# Patient Record
Sex: MALE | Race: WHITE | NOT HISPANIC OR LATINO | URBAN - METROPOLITAN AREA
[De-identification: names, ages, dates, MRNs, and addresses within clinical notes are randomized per-mention and may not be internally consistent; named-entity substitution may affect disease eponyms.]

---

## 2020-07-07 ENCOUNTER — EMERGENCY (EMERGENCY)
Facility: HOSPITAL | Age: 75
LOS: 1 days | Discharge: HOME | End: 2020-07-07
Attending: EMERGENCY MEDICINE | Admitting: EMERGENCY MEDICINE
Payer: MEDICARE

## 2020-07-07 VITALS
RESPIRATION RATE: 16 BRPM | OXYGEN SATURATION: 94 % | WEIGHT: 279.99 LBS | SYSTOLIC BLOOD PRESSURE: 169 MMHG | DIASTOLIC BLOOD PRESSURE: 76 MMHG | HEART RATE: 100 BPM | TEMPERATURE: 97 F

## 2020-07-07 PROCEDURE — 99283 EMERGENCY DEPT VISIT LOW MDM: CPT

## 2020-07-07 PROCEDURE — 99284 EMERGENCY DEPT VISIT MOD MDM: CPT | Mod: 25

## 2020-07-07 PROCEDURE — 96374 THER/PROPH/DIAG INJ IV PUSH: CPT

## 2020-07-07 PROCEDURE — 96375 TX/PRO/DX INJ NEW DRUG ADDON: CPT

## 2020-07-07 RX ORDER — TAMSULOSIN HYDROCHLORIDE 0.4 MG/1
0.4 CAPSULE ORAL ONCE
Refills: 0 | Status: DISCONTINUED | OUTPATIENT
Start: 2020-07-07 | End: 2020-07-07

## 2020-07-07 RX ORDER — FAMOTIDINE 10 MG/ML
20 INJECTION INTRAVENOUS ONCE
Refills: 0 | Status: COMPLETED | OUTPATIENT
Start: 2020-07-07 | End: 2020-07-07

## 2020-07-07 RX ORDER — DIPHENHYDRAMINE HCL 50 MG
50 CAPSULE ORAL ONCE
Refills: 0 | Status: COMPLETED | OUTPATIENT
Start: 2020-07-07 | End: 2020-07-07

## 2020-07-07 RX ORDER — DEXAMETHASONE 0.5 MG/5ML
10 ELIXIR ORAL ONCE
Refills: 0 | Status: COMPLETED | OUTPATIENT
Start: 2020-07-07 | End: 2020-07-07

## 2020-07-07 RX ORDER — KETOROLAC TROMETHAMINE 30 MG/ML
30 SYRINGE (ML) INJECTION ONCE
Refills: 0 | Status: DISCONTINUED | OUTPATIENT
Start: 2020-07-07 | End: 2020-07-07

## 2020-07-07 RX ADMIN — Medication 50 MILLIGRAM(S): at 02:48

## 2020-07-07 RX ADMIN — Medication 125 MILLIGRAM(S): at 02:48

## 2020-07-07 RX ADMIN — Medication 102 MILLIGRAM(S): at 03:40

## 2020-07-07 RX ADMIN — FAMOTIDINE 20 MILLIGRAM(S): 10 INJECTION INTRAVENOUS at 02:48

## 2020-07-07 NOTE — ED PROVIDER NOTE - PATIENT PORTAL LINK FT
You can access the FollowMyHealth Patient Portal offered by [facility] by registering at the following website: [email or web address]. By joining [facility]’s FollowMyHealth portal, you will also be able to view your health information using other applications (apps) compatible with our system.

## 2020-07-07 NOTE — ED PROVIDER NOTE - PROGRESS NOTE DETAILS
pt reevaluated, feeling better, swelling has improved drastically, pt speech has improved and tongue is smaller, pt to be d/c home with prednisone, follow up with pmd, return if any sytmpoms worsen

## 2020-07-07 NOTE — ED PROVIDER NOTE - OBJECTIVE STATEMENT
73yo male who presents with swollen tongue. pt states he woke up because he was uncomfortable and felt his tongue swelling, no sob, no drooling or change in voice, no hx of angioedema, no ace inhibitors, no new soaps detergents or medications, pt did not take anything at home

## 2020-07-07 NOTE — ED ADULT NURSE NOTE - OBJECTIVE STATEMENT
patient a/o x 4 with a calm affect c/o allergic reaction with swelling of the tongue. patient denies difficulty breathing at this time.  patient in view of nursing station, rn will continue to monitor patient closely

## 2021-02-02 NOTE — ED ADULT TRIAGE NOTE - SOURCE OF INFORMATION
New Patient Oncology Nurse Navigator Note     Referring provider:   Agus Parra PA-C [facility]       [facility] RAMANDEEP Howell at [facility] [address] [place] [address]. Care  [phone]            Patient

## 2023-05-02 NOTE — ED PROVIDER NOTE - NEUROLOGICAL, MLM
Alert and oriented, no focal deficits, no motor or sensory deficits. 3 - Moderate disability. Requires some help, but able to walk unassisted. 02-May-2023 20:56

## 2025-01-20 NOTE — ED ADULT NURSE NOTE - NS TRANSFER PATIENT BELONGINGS
T(C): 37 (01-20-25 @ 11:23), Max: 37 (01-20-25 @ 11:23)  HR: 71 (01-20-25 @ 11:23) (64 - 71)  BP: 183/85 (01-20-25 @ 11:23) (178/90 - 215/102)  RR: 23 (01-20-25 @ 11:23) (18 - 23)  SpO2: 100% (01-20-25 @ 11:23) (100% - 100%)  GENERAL: NAD, lying in bed comfortably  HEAD:  Atraumatic, normocephalic  EYES: EOMI, PERRLA, conjunctiva and sclera clear  NECK: Supple, trachea midline, no JVD  HEART: Regular rate and rhythm, no murmurs, rubs, or gallops  LUNGS: Unlabored respirations.  Clear to auscultation bilaterally, no crackles, wheezing, or rhonchi  ABDOMEN: Soft, nontender, nondistended, +BS/  ha s no pain  now  EXTREMITIES: 2+ peripheral pulses bilaterally. No clubbing, cyanosis, or edema  NERVOUS SYSTEM:  A&Ox3, moving all extremities, no focal deficits   SKIN: No rashes or lesions Clothing